# Patient Record
Sex: FEMALE | Race: WHITE | URBAN - METROPOLITAN AREA
[De-identification: names, ages, dates, MRNs, and addresses within clinical notes are randomized per-mention and may not be internally consistent; named-entity substitution may affect disease eponyms.]

---

## 2017-01-30 ENCOUNTER — HISTORICAL (OUTPATIENT)
Dept: INFUSION THERAPY | Facility: HOSPITAL | Age: 67
End: 2017-01-30

## 2018-02-05 ENCOUNTER — HISTORICAL (OUTPATIENT)
Dept: INFUSION THERAPY | Facility: HOSPITAL | Age: 68
End: 2018-02-05

## 2018-07-18 ENCOUNTER — HISTORICAL (OUTPATIENT)
Dept: ADMINISTRATIVE | Facility: HOSPITAL | Age: 68
End: 2018-07-18

## 2019-03-12 ENCOUNTER — HISTORICAL (OUTPATIENT)
Dept: INFUSION THERAPY | Facility: HOSPITAL | Age: 69
End: 2019-03-12

## 2020-03-12 ENCOUNTER — HISTORICAL (OUTPATIENT)
Dept: INFUSION THERAPY | Facility: HOSPITAL | Age: 70
End: 2020-03-12

## 2022-01-24 ENCOUNTER — HISTORICAL (OUTPATIENT)
Dept: INFECTIOUS DISEASES | Facility: HOSPITAL | Age: 72
End: 2022-01-24

## 2022-04-10 ENCOUNTER — HISTORICAL (OUTPATIENT)
Dept: ADMINISTRATIVE | Facility: HOSPITAL | Age: 72
End: 2022-04-10
Payer: COMMERCIAL

## 2022-04-24 VITALS
WEIGHT: 175.94 LBS | DIASTOLIC BLOOD PRESSURE: 77 MMHG | BODY MASS INDEX: 26.06 KG/M2 | HEIGHT: 69 IN | SYSTOLIC BLOOD PRESSURE: 143 MMHG

## 2022-05-04 NOTE — HISTORICAL [facility] CERNER
This is a historical note converted from [place]. Formatting and pictures may have been removed.  Please reference [place] for original formatting and attached multimedia. Chief Complaint  RT knee, hurting for about 5 months. History of meniscal tear a few years back. Looking for injection  History of Present Illness  This 67-year-old comes in for her right knee.? I had seen her greater than 5 years ago for a meniscal tear that she treated conservatively.? She is complaining of increased pain, swelling, and feeling of giving way.? She denies meniscal symptoms.  Review of Systems  Constitutional: No fever, weakness, or fatigue.  Ear/Nose/Mouth/Throat: No nasal congestion or sore throat.  Respiratory: No shortness of breath or cough.  Cardiovascular: No chest pain, palpitations, or peripheral edema.  Gastrointestinal: No nausea, vomiting, or abdominal pain.  Genitourinary: No dysuria.  Musculoskeletal: See current complaints  Integumentary: Negative.  Physical Exam  Vitals & Measurements  HR:?70(Peripheral)? BP:?143/77?  HT:?175?cm? HT:?175?cm? WT:?79.8?kg? WT:?79.8?kg? BMI:?26.06?  Physical examination shows that the patient has a moderate effusion in her right knee.? She is exquisitely tender on the medial joint line in the posterior medial aspect of her knee.? She has no pes anserine bursitis. ?She has a small Bakers cyst. ?She has significant pain with medial Mendel testing but no pop is elicited.? She has a negative lateral McMurrays test.? She has no evidence of ligamentous laxity.? Range of motion is 3-115?. ?She has good patella tracking.? She is motor and sensory intact.  ?  Standing AP, lateral, and sunrise view of the right knee shows that the patient has early tricompartmental arthritis?with subchondral?sclerosis and minimal periarticular osteophyte formation.  Assessment/Plan  1.?Right knee pain  ? The findings were reviewed with the patient and she expressed understanding.? The patient opted for an  injection.? The patients right knee was injected with dexamethasone 1 mL under sterile conditions. ?The patient tolerated the injection without difficulty.? I will see her back?as needed at her request. ?She is instructed to call if she has any problems.  Ordered:  dexamethasone, 4 mg, Intra-Articular, Once, first dose 07/18/18 14:00:00 CDT, stop date 07/18/18 14:00:00 CDT  asp/inj jnt/bursa, major 20610 PC, 07/18/18 13:31:00 CDT, RT, [facility] [place], Routine, 07/18/18 13:31:00 CDT  Office/Outpatient Visit Level 3 Established 87703 PC, Right knee pain  Primary osteoarthritis of right knee, [facility] [place], 07/18/18 13:31:00 CDT  XR Knee Right 3 Views, Routine, 07/18/18 13:11:00 CDT, Pain, None, Ambulatory, Patient Has IV?, Rad Type, Right knee pain, Not Scheduled, 07/18/18 13:11:00 CDT  ?  2.?Primary osteoarthritis of right knee  Ordered:  dexamethasone, 4 mg, Intra-Articular, Once, first dose 07/18/18 14:00:00 CDT, stop date 07/18/18 14:00:00 CDT  asp/inj jnt/bursa, major [id number] PC, 07/18/18 13:31:00 CDT, RT, [facility] [place], Routine, 07/18/18 13:31:00 CDT  Office/Outpatient Visit Level 3 Established 44840 PC, Right knee pain  Primary osteoarthritis of right knee, [facility] [place], 07/18/18 13:31:00 CDT  ?  Orders:  Clinic Follow-up PRN, 07/18/18 13:32:00 CDT, Future Order, [facility] [place]   Problem List/Past Medical History  Ongoing  High cholesterol  Hypertension  Primary osteoarthritis of right knee  Historical  No qualifying data  Procedure/Surgical History  endometriosis surgery  hysterectomy  in vitro fertilization  Left hip replacement  left hip revision  shoulder surgery   Medications  ATORVASTATIN 20 MG TABLET, 20 mg= 1 tab(s), Oral, Daily  dexamethasone, 4 mg, Intra-Articular, Once  Fish Oil 1200 mg oral capsule, 1200 mg= 1 cap(s), Oral, TID  LISINOPRIL TAB 10MG, 10 mg= 1 tab(s), Oral, Daily  multivitamin with minerals (Adult Tab), Oral, Daily  RANITIDINE TAB 300MG, 300 mg= 1  tab(s), Oral, qPM  VERAPAMIL CAP 240MG ER, 240 mg= 1 cap(s), Oral, Daily  VIT D2 1.25 MG (50,000 UNIT)  Vitamin B Complex oral capsule, Oral, Daily  Allergies  Egg  Social History  Tobacco  Former smoker, 01/30/2017  Family History  Acute myocardial infarction.: Sister and Brother.  Congestive heart disease.: Father.  Coronary artery disease: Mother, Sister and Brother.  Hypertension.: Sister and Brother.  Stroke: Mother.  Health Maintenance  Health Maintenance  ???Pending?(in the next year)  ??? ??OverDue  ??? ? ? ?Lipid Screening due??12/10/14??and every 1??year(s)  ??? ? ? ?Diabetes Screening due??02/03/15??and every 3??year(s)  ??? ??Due?  ??? ? ? ?Alcohol Misuse Screening due??07/18/18??and every 1??year(s)  ??? ? ? ?Aspirin Therapy for CVD Prevention due??07/18/18??and every 1??year(s)  ??? ? ? ?Bone Density Screening due??07/18/18??Variable frequency  ??? ? ? ?Breast Cancer Screening due??07/18/18??Variable frequency  ??? ? ? ?Colorectal Screening due??07/18/18??Variable frequency  ??? ? ? ?Depression Screening due??07/18/18??and every 1??year(s)  ??? ? ? ?Fall Risk Assessment due??07/18/18??and every 1??year(s)  ??? ? ? ?Functional Assessment due??07/18/18??and every 1??year(s)  ??? ? ? ?Hypertension Management-Education due??07/18/18??and every 1??year(s)  ??? ? ? ?Hypertension Management-BMP due??07/18/18??Variable frequency  ??? ? ? ?Pneumococcal Vaccine due??07/18/18??and every 100??year(s)  ??? ? ? ?Smoking Cessation due??07/18/18??and every 1??year(s)  ??? ? ? ?Tetanus Vaccine due??07/18/18??and every 10??year(s)  ??? ? ? ?Zoster Vaccine due??07/18/18??and every 100??year(s)  ??? ??Due In Future?  ??? ? ? ?Hypertension Management-Blood Pressure not due until??01/18/19??and every 6??month(s)  ???Satisfied?(in the past 1 year)  ??? ??Satisfied?  ??? ? ? ?Blood Pressure Screening on??07/18/18.??Satisfied by Manuel Infante  ??? ? ? ?Body Mass Index Check on??07/18/18.??Satisfied by Manuel Infante  ???  ? ? ?Hypertension Management-Blood Pressure on??07/18/18.??Satisfied by Manuel Infante  ??? ? ? ?Obesity Screening on??07/18/18.??Satisfied by Manuel Infante  ??? ? ? ?Tobacco Use Screening on??07/18/18.??Satisfied by Manuel Infante  ?  ?

## 2024-10-09 ENCOUNTER — LAB REQUISITION (OUTPATIENT)
Dept: LAB | Facility: HOSPITAL | Age: 74
End: 2024-10-09
Payer: COMMERCIAL

## 2024-10-09 DIAGNOSIS — I10 ESSENTIAL (PRIMARY) HYPERTENSION: ICD-10-CM

## 2024-10-09 LAB
ANION GAP SERPL CALC-SCNC: 13 MEQ/L
BASOPHILS # BLD AUTO: 0.1 X10(3)/MCL
BASOPHILS NFR BLD AUTO: 1.4 %
BUN SERPL-MCNC: 9.8 MG/DL (ref 9.8–20.1)
CALCIUM SERPL-MCNC: 9.7 MG/DL (ref 8.4–10.2)
CHLORIDE SERPL-SCNC: 107 MMOL/L (ref 98–107)
CO2 SERPL-SCNC: 24 MMOL/L (ref 23–31)
CREAT SERPL-MCNC: 0.65 MG/DL (ref 0.55–1.02)
CREAT/UREA NIT SERPL: 15
EOSINOPHIL # BLD AUTO: 0.16 X10(3)/MCL (ref 0–0.9)
EOSINOPHIL NFR BLD AUTO: 2.3 %
ERYTHROCYTE [DISTWIDTH] IN BLOOD BY AUTOMATED COUNT: 13.8 % (ref 11.5–17)
GFR SERPLBLD CREATININE-BSD FMLA CKD-EPI: >60 ML/MIN/1.73/M2
GLUCOSE SERPL-MCNC: 98 MG/DL (ref 82–115)
HCT VFR BLD AUTO: 31.3 % (ref 37–47)
HGB BLD-MCNC: 10.6 G/DL (ref 12–16)
IMM GRANULOCYTES # BLD AUTO: 0.02 X10(3)/MCL (ref 0–0.04)
IMM GRANULOCYTES NFR BLD AUTO: 0.3 %
LYMPHOCYTES # BLD AUTO: 1.75 X10(3)/MCL (ref 0.6–4.6)
LYMPHOCYTES NFR BLD AUTO: 25.4 %
MCH RBC QN AUTO: 31 PG (ref 27–31)
MCHC RBC AUTO-ENTMCNC: 33.9 G/DL (ref 33–36)
MCV RBC AUTO: 91.5 FL (ref 80–94)
MONOCYTES # BLD AUTO: 0.48 X10(3)/MCL (ref 0.1–1.3)
MONOCYTES NFR BLD AUTO: 7 %
NEUTROPHILS # BLD AUTO: 4.39 X10(3)/MCL (ref 2.1–9.2)
NEUTROPHILS NFR BLD AUTO: 63.6 %
NRBC BLD AUTO-RTO: 0 %
PLATELET # BLD AUTO: 579 X10(3)/MCL (ref 130–400)
PMV BLD AUTO: 10.2 FL (ref 7.4–10.4)
POTASSIUM SERPL-SCNC: 4.6 MMOL/L (ref 3.5–5.1)
RBC # BLD AUTO: 3.42 X10(6)/MCL (ref 4.2–5.4)
SODIUM SERPL-SCNC: 144 MMOL/L (ref 136–145)
WBC # BLD AUTO: 6.9 X10(3)/MCL (ref 4.5–11.5)

## 2024-10-09 PROCEDURE — 80048 BASIC METABOLIC PNL TOTAL CA: CPT | Performed by: PHYSICAL MEDICINE & REHABILITATION

## 2024-10-09 PROCEDURE — 85025 COMPLETE CBC W/AUTO DIFF WBC: CPT | Performed by: PHYSICAL MEDICINE & REHABILITATION
